# Patient Record
Sex: MALE | Race: WHITE | NOT HISPANIC OR LATINO | URBAN - METROPOLITAN AREA
[De-identification: names, ages, dates, MRNs, and addresses within clinical notes are randomized per-mention and may not be internally consistent; named-entity substitution may affect disease eponyms.]

---

## 2020-12-19 ENCOUNTER — EMERGENCY (EMERGENCY)
Facility: HOSPITAL | Age: 45
LOS: 1 days | Discharge: HOME | End: 2020-12-19
Attending: STUDENT IN AN ORGANIZED HEALTH CARE EDUCATION/TRAINING PROGRAM
Payer: COMMERCIAL

## 2020-12-19 LAB
ALBUMIN SERPL ELPH-MCNC: 4.2 G/DL — SIGNIFICANT CHANGE UP (ref 3.3–5.2)
ALP SERPL-CCNC: 85 U/L — SIGNIFICANT CHANGE UP (ref 40–120)
ALT FLD-CCNC: 45 U/L — HIGH
ANION GAP SERPL CALC-SCNC: 11 MMOL/L — SIGNIFICANT CHANGE UP (ref 5–17)
APTT BLD: 25.2 SEC — LOW (ref 27.5–35.5)
AST SERPL-CCNC: 76 U/L — HIGH
BASOPHILS # BLD AUTO: 0.08 K/UL — SIGNIFICANT CHANGE UP (ref 0–0.2)
BASOPHILS NFR BLD AUTO: 0.6 % — SIGNIFICANT CHANGE UP (ref 0–2)
BILIRUB SERPL-MCNC: <0.2 MG/DL — LOW (ref 0.4–2)
BLD GP AB SCN SERPL QL: SIGNIFICANT CHANGE UP
BUN SERPL-MCNC: 16 MG/DL — SIGNIFICANT CHANGE UP (ref 8–20)
CALCIUM SERPL-MCNC: 8.6 MG/DL — SIGNIFICANT CHANGE UP (ref 8.6–10.2)
CHLORIDE SERPL-SCNC: 100 MMOL/L — SIGNIFICANT CHANGE UP (ref 98–107)
CO2 SERPL-SCNC: 25 MMOL/L — SIGNIFICANT CHANGE UP (ref 22–29)
CREAT SERPL-MCNC: 0.79 MG/DL — SIGNIFICANT CHANGE UP (ref 0.5–1.3)
EOSINOPHIL # BLD AUTO: 0.35 K/UL — SIGNIFICANT CHANGE UP (ref 0–0.5)
EOSINOPHIL NFR BLD AUTO: 2.7 % — SIGNIFICANT CHANGE UP (ref 0–6)
ETHANOL SERPL-MCNC: 180 MG/DL — HIGH (ref 0–9)
GLUCOSE SERPL-MCNC: 120 MG/DL — HIGH (ref 70–99)
HCT VFR BLD CALC: 44.6 % — SIGNIFICANT CHANGE UP (ref 39–50)
HGB BLD-MCNC: 14.8 G/DL — SIGNIFICANT CHANGE UP (ref 13–17)
IMM GRANULOCYTES NFR BLD AUTO: 0.8 % — SIGNIFICANT CHANGE UP (ref 0–1.5)
INR BLD: 1.03 RATIO — SIGNIFICANT CHANGE UP (ref 0.88–1.16)
LYMPHOCYTES # BLD AUTO: 2.71 K/UL — SIGNIFICANT CHANGE UP (ref 1–3.3)
LYMPHOCYTES # BLD AUTO: 21.2 % — SIGNIFICANT CHANGE UP (ref 13–44)
MCHC RBC-ENTMCNC: 31.4 PG — SIGNIFICANT CHANGE UP (ref 27–34)
MCHC RBC-ENTMCNC: 33.2 GM/DL — SIGNIFICANT CHANGE UP (ref 32–36)
MCV RBC AUTO: 94.5 FL — SIGNIFICANT CHANGE UP (ref 80–100)
MONOCYTES # BLD AUTO: 0.92 K/UL — HIGH (ref 0–0.9)
MONOCYTES NFR BLD AUTO: 7.2 % — SIGNIFICANT CHANGE UP (ref 2–14)
NEUTROPHILS # BLD AUTO: 8.65 K/UL — HIGH (ref 1.8–7.4)
NEUTROPHILS NFR BLD AUTO: 67.5 % — SIGNIFICANT CHANGE UP (ref 43–77)
PLATELET # BLD AUTO: 244 K/UL — SIGNIFICANT CHANGE UP (ref 150–400)
POTASSIUM SERPL-MCNC: 3.7 MMOL/L — SIGNIFICANT CHANGE UP (ref 3.5–5.3)
POTASSIUM SERPL-SCNC: 3.7 MMOL/L — SIGNIFICANT CHANGE UP (ref 3.5–5.3)
PROT SERPL-MCNC: 6.9 G/DL — SIGNIFICANT CHANGE UP (ref 6.6–8.7)
PROTHROM AB SERPL-ACNC: 11.9 SEC — SIGNIFICANT CHANGE UP (ref 10.6–13.6)
RBC # BLD: 4.72 M/UL — SIGNIFICANT CHANGE UP (ref 4.2–5.8)
RBC # FLD: 12.2 % — SIGNIFICANT CHANGE UP (ref 10.3–14.5)
SARS-COV-2 RNA SPEC QL NAA+PROBE: SIGNIFICANT CHANGE UP
SODIUM SERPL-SCNC: 136 MMOL/L — SIGNIFICANT CHANGE UP (ref 135–145)
TROPONIN T SERPL-MCNC: <0.01 NG/ML — SIGNIFICANT CHANGE UP (ref 0–0.06)
WBC # BLD: 12.81 K/UL — HIGH (ref 3.8–10.5)
WBC # FLD AUTO: 12.81 K/UL — HIGH (ref 3.8–10.5)

## 2020-12-19 PROCEDURE — 99283 EMERGENCY DEPT VISIT LOW MDM: CPT | Mod: GC

## 2020-12-19 PROCEDURE — 72125 CT NECK SPINE W/O DYE: CPT | Mod: 26

## 2020-12-19 PROCEDURE — 70450 CT HEAD/BRAIN W/O DYE: CPT | Mod: 26

## 2020-12-19 PROCEDURE — 74177 CT ABD & PELVIS W/CONTRAST: CPT | Mod: 26

## 2020-12-19 PROCEDURE — 71045 X-RAY EXAM CHEST 1 VIEW: CPT | Mod: 26

## 2020-12-19 PROCEDURE — 99291 CRITICAL CARE FIRST HOUR: CPT

## 2020-12-19 PROCEDURE — 71260 CT THORAX DX C+: CPT | Mod: 26

## 2020-12-19 RX ORDER — ACETAMINOPHEN 500 MG
650 TABLET ORAL EVERY 6 HOURS
Refills: 0 | Status: DISCONTINUED | OUTPATIENT
Start: 2020-12-19 | End: 2020-12-24

## 2020-12-19 RX ORDER — SODIUM CHLORIDE 9 MG/ML
1000 INJECTION, SOLUTION INTRAVENOUS ONCE
Refills: 0 | Status: COMPLETED | OUTPATIENT
Start: 2020-12-19 | End: 2020-12-19

## 2020-12-19 RX ORDER — TRAMADOL HYDROCHLORIDE 50 MG/1
25 TABLET ORAL ONCE
Refills: 0 | Status: DISCONTINUED | OUTPATIENT
Start: 2020-12-19 | End: 2020-12-19

## 2020-12-19 RX ADMIN — SODIUM CHLORIDE 2000 MILLILITER(S): 9 INJECTION, SOLUTION INTRAVENOUS at 22:54

## 2020-12-19 RX ADMIN — Medication 650 MILLIGRAM(S): at 23:35

## 2020-12-19 RX ADMIN — TRAMADOL HYDROCHLORIDE 25 MILLIGRAM(S): 50 TABLET ORAL at 23:05

## 2020-12-19 RX ADMIN — SODIUM CHLORIDE 1000 MILLILITER(S): 9 INJECTION, SOLUTION INTRAVENOUS at 22:54

## 2020-12-19 RX ADMIN — Medication 650 MILLIGRAM(S): at 23:05

## 2020-12-19 RX ADMIN — TRAMADOL HYDROCHLORIDE 25 MILLIGRAM(S): 50 TABLET ORAL at 23:35

## 2020-12-19 NOTE — ED PROVIDER NOTE - PHYSICAL EXAMINATION
Constitutional: Awake, alert, in no acute distress, +slurred speech  Eyes: PERRL  HENT: no scalp tenderness or deformity, no facial tenderness  Neck: no cervical spine tenderness, no palpable stepoff.  Airway patent, no tracheal deviation  CV: no chest wall tenderness, no crepitus or subcutaneous emphysema.  RRR, no murmur, 2+ distal pulses in all extremities  Pulm: non-labored respirations, CTAB  Abdomen: soft, non-tender, non-distended, no ecchymosis  Back: no spinal tenderness, no palpable stepoff  Extremities: stable pelvis, no extremity tenderness or deformity  Skin: no rash, +scattered abrasions  Neuro: AAOx3, GCS 14 (mild confusion), moving all extremities equally, no focal neurologic deficit
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
## 2020-12-19 NOTE — ED PROVIDER NOTE - PATIENT PORTAL LINK FT
You can access the FollowMyHealth Patient Portal offered by [facility] by registering at the following website: [email or web address]. By joining [facility]’s FollowMyHealth portal, you will also be able to view your health information using other applications (apps) compatible with our system.

## 2020-12-19 NOTE — ED ADULT NURSE REASSESSMENT NOTE - NS ED NURSE REASSESS COMMENT FT1
Assumed pt care @ this time. Report received from Vladimir DIAZ in CC. Pt received in yellow gown and in c-collar. Pt resting comfortably in stretcher @ this time. Pt remains on CM w/. VSS. Respirations even & unlabored. NAD present. Pt safety maintained.
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
## 2020-12-19 NOTE — ED PROVIDER NOTE - NSFOLLOWUPINSTRUCTIONS_ED_ALL_ED_FT
Fractura del platillo tibial sin desplazamiento  (Nondisplaced Tibial Plateau Fracture)    La fractura del platillo tibial es la rotura del hueso que constituye la parte inferior de la articulación de la rodilla (tibia). El extremo inferior del hueso del muslo (fémur) forma la superficie superior de la articulación de la rodilla. La parte superior de la tibia consta de jami superficie leonila y plana (platillo tibial), que está formada por un tipo de hueso más blando que la diáfisis (cuerpo) de la tibia. Si jami fuerza brusca empuja el fémur hacia el platillo tibial, diana puede colapsar o romperse en los bordes, lo que también se conoce og fractura intrarticular.     Jami fractura sin desplazamiento significa que la sección o las secciones rotas del platillo tibial no se river salido de ibrahim posición normal. Por lo general, diana tipo de fractura puede tratarse sin cirugía. Deberá usar jami férula o un dispositivo ortopédico y evitar apoyar el peso del cuerpo sobre la rodilla mientras la fractura se consolide.    CAUSAS  Las causas frecuentes de diana tipo de fractura incluyen lo siguiente:    Accidentes automovilísticos.  Francia o caídas desde jami altura importante.  Lesiones por la práctica de deportes de jamie intensidad o de contacto.    FACTORES DE RIESGO  Puede correr un riesgo mayor de tener diana tipo de fractura si:    Practica deportes de jamie intensidad o de contacto.  Tiene antecedentes de infecciones en los huesos.  Es jami persona de edad avanzada y tiene jami afección que causa huesos frágiles (osteoporosis).    SIGNOS Y SÍNTOMAS  Los signos y los síntomas de jami fractura del platillo tibial sin desplazamiento comienzan inmediatamente después de la lesión. Estos pueden incluir los siguientes:    Dolor que empeora al apoyar el peso del cuerpo sobre la rodilla.  Hinchazón de la rodilla.  Hematomas alrededor de la rodilla.    DIAGNÓSTICO  El médico puede sospechar la presencia de jami fractura del platillo tibial sin desplazamiento en función de los signos y los síntomas, especialmente si usted tuvo jami lesión reciente. También le realizará un examen físico. Diana puede incluir pruebas de diagnóstico por imágenes, og las siguientes:    Radiografía de la rodilla. Esta prueba se utiliza para confirmar el diagnóstico.  Tomografía computarizada o resonancia magnética. Estos estudios garantizan que no se haya desplazado ningún hueso y que no haya otras lesiones en la rodilla.    TRATAMIENTO  El tratamiento de jami fractura del platillo tibial sin desplazamiento puede incluir el uso de jami férula o un dispositivo ortopédico para sostener la pierna en jami posición determinada mientras el hueso se consolida. También es posible que deba usar muletas, un patinete con soporte para el pie, un andador o jami silla de rajan para moverse sin tener que apoyar el peso del cuerpo en la pierna lesionada. Además, es posible que le receten analgésicos.    Mientras el hueso de la rodilla se consolida, puede visitar a un fisioterapeuta. El fisioterapeuta le moverá la rodilla sin hacer peso sobre erik (ejercicio pasivo), lo que ayuda a evitar que se entumezca. Después de que se haya recuperado, el médico le mostrará la forma de hacer ejercicios de flexibilidad para fortalecer los músculos de la rodilla y evitar que se entumezcan.    INSTRUCCIONES PARA EL CUIDADO EN EL HOGAR  Si tiene jami férula o un dispositivo ortopédico:    Úselos según las indicaciones del médico. Quíteselos solamente según las indicaciones del médico.  Afloje la férula o el dispositivo ortopédico si los dedos se le entumecen, siente hormigueos o se le enfrían y se tornan de color maciej.    El baño    Cubra la férula o el dispositivo ortopédico con jami bolsa plástica hermética para protegerlos del agua mientras se baña o pranav jami ducha. No permita que la férula o el dispositivo ortopédico se humedezcan.    Control del dolor, la rigidez y la hinchazón    Si se lo indican, aplique hielo sobre la naomie lesionada:  Ponga el hielo en jami bolsa plástica.  Coloque jami toalla entre la piel y la bolsa de hielo.  Coloque el hielo brianna 20 minutos, 2 a 3 veces por día.  Mueva los dedos y el tobillo con frecuencia para evitar que se entumezcan y reducir la hinchazón.  Cuando esté sentado o acostado, mantenga la naomie lesionada por encima del nivel del corazón.    Conducir    No conduzca ni opere maquinaria pesada mientras pranav analgésicos.  No conduzca mientras use jami férula o un dispositivo ortopédico en la pierna.    Actividad    Reanude nanda actividades normales og se lo haya indicado el médico. Pregúntele al médico qué actividades son seguras para usted.  Sommer ejercicios de flexibilidad solamente og se lo haya indicado el médico.    Seguridad    No apoye el peso del cuerpo sobre la extremidad lesionada hasta que lo autorice el médico. Use muletas, un patinete con soporte para el pie, un andador o jami silla de rajan según se lo haya indicado el médico.    Instrucciones generales    Mantenga la férula o el dispositivo ortopédico limpios y secos.  No consuma ningún producto que contenga tabaco, lo que incluye cigarrillos, tabaco de mascar o cigarrillos electrónicos. El tabaco puede retardar la consolidación de la fractura. Si necesita ayuda para dejar de fumar, consulte al médico.  [place] los medicamentos solamente og se lo haya indicado el médico.  Concurra a todas las visitas de control og se lo haya indicado el médico. [place] es importante.    SOLICITE ATENCIÓN MÉDICA SI:  El dolor empeora.  El medicamento no le calma el dolor.    SOLICITE ATENCIÓN MÉDICA DE INMEDIATO SI:  Siente un dolor muy intenso en la pierna lesionada.  Tiene hinchazón o enrojecimiento que empeoran en el pie.  Comienza a perder la sensibilidad en el pie o en los dedos.  Tiene el pie o los dedos fríos o azules.    NOTAS ADICIONALES E INSTRUCCIONES    -Por favor use las muletas, y no ponga peso en la pierna derecha.   - Por favor tenga seguimiento con el ortopeda entre 2 aguilar.  Por favor tenga seguimiento con ibrahim doctor primario entre 2 aguilar.  Regrese a urgencias por cualquier preocupacion medica.
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
## 2020-12-19 NOTE — ED ADULT NURSE NOTE - CAS ELECT INFOMATION PROVIDED
Pt verbalized understanding of discharge instructions using teach back method, need for followup with (PMD and/or specialist) as soon as possible. Pt also provided with s/s to return to the emergency department immediately if they should present. Patient A&Ox4, resps even/unlabored, pt in no apparent distress upon discharge. Saline lock removed, bleeding controlled, dressing in place./DC instructions

## 2020-12-19 NOTE — H&P ADULT - ATTENDING COMMENTS
I HAVE SEEN AND EXAMINED THE PATIENT WITH THE PA/RESIDENT/NP.  I AGREE WITH THE ABOVE NOTE, ASSESSMENT, PLAN AND PE.   Trauma scan negative, care returned back to ED.    JANNY

## 2020-12-19 NOTE — H&P ADULT - NSHPPHYSICALEXAM_GEN_ALL_CORE
Constitutional: Well-developed well nourished male in no acute distress  HEENT: Head is normocephalic and atraumatic, maxillofacial structures stable, no blood or discharge from nares or oral cavity, no jacinto sign / raccoon eyes, EOMI b/l, pupils 3mm round and reactive to light b/l, no active drainage or redness. mild abrasions to face  Neck: cervical collar in place, trachea midline  Respiratory: Breath sounds CTA b/l respirations are unlabored, no accessory muscle use, no conversational dyspnea  Cardiovascular: Regular rate & rhythm, +S1, S2  Chest: Chest wall is non-tender to palpation, no subQ emphysema or crepitus palpated  Gastrointestinal: Abdomen soft, non-tender, non-distended, no rebound tenderness / guarding, no ecchymosis or external signs of abdominal trauma  Extremities: moving all extremities spontaneously, no point tenderness or deformity noted to upper or lower extremities b/l  Pelvis: stable  Vascular: 2+ radial, femoral, and DP pulses b/l  Neurological: GCS: 15 (4/5/6). A&O x 3; no gross sensory / motor / coordination deficits  Musculoskeletal: 5/5 strength of upper and lower extremities b/l  Back: no C/T/LS spine tenderness to palpation, no step-offs or signs of external trauma to the back
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
## 2020-12-20 VITALS
HEART RATE: 85 BPM | TEMPERATURE: 98 F | SYSTOLIC BLOOD PRESSURE: 116 MMHG | DIASTOLIC BLOOD PRESSURE: 84 MMHG | OXYGEN SATURATION: 100 % | RESPIRATION RATE: 18 BRPM

## 2020-12-20 VITALS
SYSTOLIC BLOOD PRESSURE: 109 MMHG | HEART RATE: 92 BPM | RESPIRATION RATE: 18 BRPM | DIASTOLIC BLOOD PRESSURE: 68 MMHG | TEMPERATURE: 98 F | OXYGEN SATURATION: 100 %

## 2020-12-20 PROCEDURE — 73562 X-RAY EXAM OF KNEE 3: CPT

## 2020-12-20 PROCEDURE — 85610 PROTHROMBIN TIME: CPT

## 2020-12-20 PROCEDURE — 82962 GLUCOSE BLOOD TEST: CPT

## 2020-12-20 PROCEDURE — 80307 DRUG TEST PRSMV CHEM ANLYZR: CPT

## 2020-12-20 PROCEDURE — 85025 COMPLETE CBC W/AUTO DIFF WBC: CPT

## 2020-12-20 PROCEDURE — 86900 BLOOD TYPING SEROLOGIC ABO: CPT

## 2020-12-20 PROCEDURE — 99291 CRITICAL CARE FIRST HOUR: CPT

## 2020-12-20 PROCEDURE — 73562 X-RAY EXAM OF KNEE 3: CPT | Mod: 26,RT

## 2020-12-20 PROCEDURE — 84484 ASSAY OF TROPONIN QUANT: CPT

## 2020-12-20 PROCEDURE — 72125 CT NECK SPINE W/O DYE: CPT

## 2020-12-20 PROCEDURE — 71260 CT THORAX DX C+: CPT

## 2020-12-20 PROCEDURE — U0003: CPT

## 2020-12-20 PROCEDURE — T1013: CPT

## 2020-12-20 PROCEDURE — 85730 THROMBOPLASTIN TIME PARTIAL: CPT

## 2020-12-20 PROCEDURE — 70450 CT HEAD/BRAIN W/O DYE: CPT

## 2020-12-20 PROCEDURE — 36415 COLL VENOUS BLD VENIPUNCTURE: CPT

## 2020-12-20 PROCEDURE — 73700 CT LOWER EXTREMITY W/O DYE: CPT | Mod: 26,RT

## 2020-12-20 PROCEDURE — 86901 BLOOD TYPING SEROLOGIC RH(D): CPT

## 2020-12-20 PROCEDURE — 73700 CT LOWER EXTREMITY W/O DYE: CPT

## 2020-12-20 PROCEDURE — 71045 X-RAY EXAM CHEST 1 VIEW: CPT

## 2020-12-20 PROCEDURE — 86850 RBC ANTIBODY SCREEN: CPT

## 2020-12-20 PROCEDURE — 74177 CT ABD & PELVIS W/CONTRAST: CPT

## 2020-12-20 PROCEDURE — 80053 COMPREHEN METABOLIC PANEL: CPT

## 2020-12-20 NOTE — ED ADULT NURSE REASSESSMENT NOTE - NS ED NURSE REASSESS COMMENT FT1
Pt remains in yellow gown with c-collar in place. Pt remains on CM w/. VSS. Pt states pain has subsided @ this time s/p pain meds. Awaiting CT @ this time.
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
## 2020-12-20 NOTE — PROGRESS NOTE ADULT - SUBJECTIVE AND OBJECTIVE BOX
TERTIARY NOTE/OVERNIGHT EVENTS:    Patient seen and evaluated at bedside and found hemodynamically stable and in no acute distress. Patient still intoxicated receiving IVF, c/o RLE pain in the knee and pain with movement of RLE. Continues to be with cervical collar, due to intoxication. Denies fevers, chills, chest pain, SOB, coughing, dizziness, n/v/d, or generalized malaise.     SUBJECTIVE:      MEDICATIONS  (STANDING):  acetaminophen   Tablet .. 650 milliGRAM(s) Oral every 6 hours    MEDICATIONS  (PRN):      Vital Signs Last 24 Hrs  T(C): 36.9 (20 Dec 2020 00:01), Max: 36.9 (20 Dec 2020 00:01)  T(F): 98.5 (20 Dec 2020 00:01), Max: 98.5 (20 Dec 2020 00:01)  HR: 92 (20 Dec 2020 00:01) (92 - 92)  BP: 109/68 (20 Dec 2020 00:01) (109/68 - 109/68)  BP(mean): --  RR: 18 (20 Dec 2020 00:01) (18 - 18)  SpO2: 100% (20 Dec 2020 00:01) (100% - 100%)    PHYSICAL EXAM:    Constitutional: Well-developed well nourished male intoxicated  HEENT: Head is normocephalic and atraumatic, maxillofacial structures stable, dry blood in nares, no jacinto sign / raccoon eyes, EOMI b/l, pupils 3mm round and reactive to light b/l, no active drainage or redness. mild abrasions to face  Neck: cervical collar in place, trachea midline  Respiratory: Breath sounds CTA b/l respirations are unlabored, no accessory muscle use, no conversational dyspnea  Cardiovascular: Regular rate & rhythm, +S1, S2  Chest: Chest wall is non-tender to palpation, no subQ emphysema or crepitus palpated  Gastrointestinal: Abdomen soft, non-tender, non-distended, no rebound tenderness / guarding, no ecchymosis or external signs of abdominal trauma  Extremities: RLE with decrease ROM and pain to palpation to patella, abrasion to shin. LLE normal ROM no pain  Pelvis: stable  Vascular: 2+ radial, femoral, and DP pulses b/l  Neurological: GCS: 14      I&O's Detail      LABS:                        14.8   12.81 )-----------( 244      ( 19 Dec 2020 20:02 )             44.6     12-19    136  |  100  |  16.0  ----------------------------<  120<H>  3.7   |  25.0  |  0.79    Ca    8.6      19 Dec 2020 20:02    TPro  6.9  /  Alb  4.2  /  TBili  <0.2<L>  /  DBili  x   /  AST  76<H>  /  ALT  45<H>  /  AlkPhos  85  12-19    PT/INR - ( 19 Dec 2020 20:02 )   PT: 11.9 sec;   INR: 1.03 ratio         PTT - ( 19 Dec 2020 20:02 )  PTT:25.2 sec      RADIOLOGY & ADDITIONAL STUDIES:    CT Head No Cont (12.19.20 @ 20:20)  IMPRESSION:    CT HEAD: No acute intracranial hemorrhage, mass effect, or osseous fracture.    CT CERVICAL SPINE: No acute cervical spine fracture or traumatic malalignment.    CT Chest w/ IV Cont (12.19.20 @ 20:21)   IMPRESSION:    Degraded image quality due to patient's arms.    CT CHEST: No acute thoracic injury identified.    CT ABDOMEN/PELVIS: No solid organ or bowel injury nor skeletal trauma in the abdomen and pelvis.

## 2020-12-20 NOTE — CONSULT NOTE ADULT - ATTENDING COMMENTS
CT right knee reviewed revealing a minimally displaced tibial plateau fracture with depressed fragment of approximately 6 mm - patient with knee immobilizer applied and to continue NWB.  Patient to follow up in 10 days in the office for repeat xrays and reevaluation.